# Patient Record
(demographics unavailable — no encounter records)

---

## 2025-04-07 NOTE — IMAGING
[de-identified] : The patient is a well appearing 18 year old male of their stated age. Patient ambulates with a normal gait. Negative straight leg raise.   Effected Foot and Ankle: RIGHT   Inspection: Erythema: None Ecchymosis: None Abrasions: None Effusion: MILD Deformity: None Pes [place] Valgus: Negative Pes Cavus: Negative   Palpation: Crepitus: None Medial Malleolus: Nontender Lateral Malleolus: TENDER  Syndesmosis: Nontender Proximal Fibula: Nontender ATFL: TENDER  CFL: Nontender Deltoid: Nontender Calcaneus: Nontender Talar Head/Neck: Nontender Peroneal Tendons: Nontender Posterior Tibialis: Nontender Achilles Tendon: Nontender & Intact Anterior Capsule: Nontender Hindfoot: Nontender Midfoot: Nontender Forefoot: Nontender   ROM: Ankle Dorsiflexion: 30 degrees Ankle Plantar Flexion: 45 degrees Motor: Dorsiflexion: 5 out of 5 Plantar Flexion: 5 out of 5 Inversion: 5  out of 5 Eversion: 5 out of 5 EHL: 5 out of 5 FHL: 5 out of 5   Provocative Testing: Anterior Drawer: Negative Syndesmosis Squeeze Test: Negative Castillo's Test: Negative Midfoot Stability/Rotation: Negative Heel Raise Inversion: Normal Triple Hop: POSITIVE  Neurologic Exam: L4-S1 Sensation: Grossly Intact Vascular Exam/Pulses: Dorsalis Pedis: 2+ Posterior Tibialis: 2+ Capillary Refill: <2 Seconds   Other Exams: None Pertinent Contralateral Findings: None   Assessment: The patient is a 18 year old male with Right ankle pain and radiographic and physical exam findings consistent with mild lateral ankle sprain The patient's condition is acute. Documents/Results Reviewed Today: X-Ray right ankle Tests/Studies Independently Interpreted Today: X-Ray right ankle is benign, no obvious bony abnormalities Pertinent findings include: ambulates with assistance of crutches, Tender lateral malleolus, Tender ATFL, MILD effusion, + triple hop,  Confounding medical conditions/concerns: None   Plan: Discussed treatment options for the patient's mild lateral ankle sprain. Patient will start physical therapy, HEP, and stretching. Advised patient to obtain Reparel sleeve. Discussed taking OTC anti-inflammatories as needed - use as directed. Modify activity as discussed.  The patient will begin use of a lace-up ankle brace to ensure stability and avoid recurrent injury - fitted and dispensed in office today. Shut down from gm/sports. We will follow up on a week-to-week basis.  Tests Ordered: None  Prescription Medications Ordered: Discussed appropriate use of OTC anti-inflammatories and analgesics (including but not limited to Aleve, Advil, Tylenol, Motrin, Ibuprofen, Voltaren gel, etc.) Braces/DME Ordered: lace up ankle brace Activity/Work/Sports Status: shut down from gym/sports Additional Instructions: None Follow-Up: 1 week  The patient's current medication management of their orthopedic diagnosis was reviewed today: The patient declined and/or was contraindicated for the recommended prescription medication Naprosyn and will use over the counter Advil, Alleve, Voltaren Gel or Tylenol as directed.  (1) We discussed a comprehensive treatment plan that included possible pharmaceutical management involving the use of prescription strength medications versus over the counter oral medications and topical prescription vs over the counter medications.  Based on our extensive discussion, the patient declined prescription medication and will use over the counter Advil, Aleve, Voltaren Gel or Tylenol as directed. (2) There is a moderate risk of morbidity with further treatment, especially from use of prescription strength medications and possible side effects of these medications which include upset stomach with oral medications, skin reactions to topical medications and cardiac/renal issues with long term use. (3) I recommended that the patient follow-up with their medical physician to discuss any significant specific potential issues with long term medication use such as interactions with current medications or with exacerbation of underlying medical comorbidities. (4) The benefits and risks associated with use of injectable, oral or topical, prescription and over the counter anti-inflammatory medications were discussed with the patient. The patient voiced understanding of the risks including but not limited to bleeding, stroke, kidney dysfunction, heart disease, and were referred to the black box warning label for further information.  I, Maria Eugenia Castillo attest that this documentation has been prepared under the direction and in the presence of Provider Dr. Adolfo Wood.

## 2025-04-07 NOTE — IMAGING
[de-identified] : The patient is a well appearing 18 year old male of their stated age. Patient ambulates with a normal gait. Negative straight leg raise.   Effected Foot and Ankle: RIGHT   Inspection: Erythema: None Ecchymosis: None Abrasions: None Effusion: MILD Deformity: None Pes [place] Valgus: Negative Pes Cavus: Negative   Palpation: Crepitus: None Medial Malleolus: Nontender Lateral Malleolus: TENDER  Syndesmosis: Nontender Proximal Fibula: Nontender ATFL: TENDER  CFL: Nontender Deltoid: Nontender Calcaneus: Nontender Talar Head/Neck: Nontender Peroneal Tendons: Nontender Posterior Tibialis: Nontender Achilles Tendon: Nontender & Intact Anterior Capsule: Nontender Hindfoot: Nontender Midfoot: Nontender Forefoot: Nontender   ROM: Ankle Dorsiflexion: 30 degrees Ankle Plantar Flexion: 45 degrees Motor: Dorsiflexion: 5 out of 5 Plantar Flexion: 5 out of 5 Inversion: 5  out of 5 Eversion: 5 out of 5 EHL: 5 out of 5 FHL: 5 out of 5   Provocative Testing: Anterior Drawer: Negative Syndesmosis Squeeze Test: Negative Castillo's Test: Negative Midfoot Stability/Rotation: Negative Heel Raise Inversion: Normal Triple Hop: POSITIVE  Neurologic Exam: L4-S1 Sensation: Grossly Intact Vascular Exam/Pulses: Dorsalis Pedis: 2+ Posterior Tibialis: 2+ Capillary Refill: <2 Seconds   Other Exams: None Pertinent Contralateral Findings: None   Assessment: The patient is a 18 year old male with Right ankle pain and radiographic and physical exam findings consistent with mild lateral ankle sprain The patient's condition is acute. Documents/Results Reviewed Today: X-Ray right ankle Tests/Studies Independently Interpreted Today: X-Ray right ankle is benign, no obvious bony abnormalities Pertinent findings include: ambulates with assistance of crutches, Tender lateral malleolus, Tender ATFL, MILD effusion, + triple hop,  Confounding medical conditions/concerns: None   Plan: Discussed treatment options for the patient's mild lateral ankle sprain. Patient will start physical therapy, HEP, and stretching. Advised patient to obtain Reparel sleeve. Discussed taking OTC anti-inflammatories as needed - use as directed. Modify activity as discussed.  The patient will begin use of a lace-up ankle brace to ensure stability and avoid recurrent injury - fitted and dispensed in office today. Shut down from gm/sports. We will follow up on a week-to-week basis.  Tests Ordered: None  Prescription Medications Ordered: Discussed appropriate use of OTC anti-inflammatories and analgesics (including but not limited to Aleve, Advil, Tylenol, Motrin, Ibuprofen, Voltaren gel, etc.) Braces/DME Ordered: lace up ankle brace Activity/Work/Sports Status: shut down from gym/sports Additional Instructions: None Follow-Up: 1 week  The patient's current medication management of their orthopedic diagnosis was reviewed today: The patient declined and/or was contraindicated for the recommended prescription medication Naprosyn and will use over the counter Advil, Alleve, Voltaren Gel or Tylenol as directed.  (1) We discussed a comprehensive treatment plan that included possible pharmaceutical management involving the use of prescription strength medications versus over the counter oral medications and topical prescription vs over the counter medications.  Based on our extensive discussion, the patient declined prescription medication and will use over the counter Advil, Aleve, Voltaren Gel or Tylenol as directed. (2) There is a moderate risk of morbidity with further treatment, especially from use of prescription strength medications and possible side effects of these medications which include upset stomach with oral medications, skin reactions to topical medications and cardiac/renal issues with long term use. (3) I recommended that the patient follow-up with their medical physician to discuss any significant specific potential issues with long term medication use such as interactions with current medications or with exacerbation of underlying medical comorbidities. (4) The benefits and risks associated with use of injectable, oral or topical, prescription and over the counter anti-inflammatory medications were discussed with the patient. The patient voiced understanding of the risks including but not limited to bleeding, stroke, kidney dysfunction, heart disease, and were referred to the black box warning label for further information.  I, Maria Eugenia Castillo attest that this documentation has been prepared under the direction and in the presence of Provider Dr. Adolfo Wood.

## 2025-04-07 NOTE — HISTORY OF PRESENT ILLNESS
[de-identified] : The patient is a 18 year  old right hand dominant male who presents today complaining of right ankle pain  Date of Injury/Onset: 4/5/25 Pain:    At Rest: _/10  With Activity:  _/10  Mechanism of injury: While practicing lacrosse he rolled his ankle  This is NOT a Work Related Injury being treated under Worker's Compensation. This is NOT an athletic injury occurring associated with an interscholastic or organized sports team. Quality of symptoms: lateral ankle pain and swelling, limited ROM  Improves with: rest, ice, NSAIDs Worse with: weight bearing  Prior treatment: crutches  Prior Imaging: none Out of work/sport: Out of sports since 4/5/25 School/Sport/Position/Occupation: [employer], going to [employer] to play lacrosse midi  Additional Information: L ankle fx surgery Dr. Murguia 2022

## 2025-04-07 NOTE — HISTORY OF PRESENT ILLNESS
[de-identified] : The patient is a 18 year  old right hand dominant male who presents today complaining of right ankle pain  Date of Injury/Onset: 4/5/25 Pain:    At Rest: _/10  With Activity:  _/10  Mechanism of injury: While practicing lacrosse he rolled his ankle  This is NOT a Work Related Injury being treated under Worker's Compensation. This is NOT an athletic injury occurring associated with an interscholastic or organized sports team. 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 Murguia 2022

## 2025-04-15 NOTE — IMAGING
[de-identified] : The patient is a well appearing 18 year old male of their stated age. Patient ambulates with a normal gait. Negative straight leg raise.   Effected Foot and Ankle: RIGHT   Inspection: Erythema: None Ecchymosis: None Abrasions: None Effusion: None  Deformity: None Pes [place] Valgus: Negative Pes Cavus: Negative   Palpation: Crepitus: None Medial Malleolus: Nontender Lateral Malleolus: Nontender Syndesmosis: Nontender Proximal Fibula: Nontender ATFL: TENDER MILDLY CFL: Nontender Deltoid: Nontender Calcaneus: Nontender Talar Head/Neck: Nontender Peroneal Tendons: Nontender Posterior Tibialis: Nontender Achilles Tendon: Nontender & Intact Anterior Capsule: Nontender Hindfoot: Nontender Midfoot: Nontender Forefoot: Nontender   ROM: Ankle Dorsiflexion: 30 degrees Ankle Plantar Flexion: 45 degrees Motor: Dorsiflexion: 5 out of 5 Plantar Flexion: 5 out of 5 Inversion: 5  out of 5 Eversion: 5 out of 5 EHL: 5 out of 5 FHL: 5 out of 5   Provocative Testing: Anterior Drawer: Negative Syndesmosis Squeeze Test: Negative Castillo's Test: Negative Midfoot Stability/Rotation: Negative Heel Raise Inversion: Normal Triple Hop: Negative Neurologic Exam: L4-S1 Sensation: Grossly Intact Vascular Exam/Pulses: Dorsalis Pedis: 2+ Posterior Tibialis: 2+ Capillary Refill: <2 Seconds   Other Exams: None Pertinent Contralateral Findings: None   Assessment: The patient is a 18 year old male with Right ankle pain and radiographic and physical exam findings consistent with mild lateral ankle sprain The patient's condition is acute. Documents/Results Reviewed Today: None  Tests/Studies Independently Interpreted Today: None  Pertinent findings include: mildly tender ATFL, - triple hop,  Confounding medical conditions/concerns: None   Plan: Patient reports gradual improvement with symptoms related to mild lateral ankle sprain. Patient will continue physical therapy, HEP, and stretching. Advised patient to obtain Reparel sleeve. Discussed taking OTC anti-inflammatories as needed - use as directed.  The patient is cleared for gym/sports in lace up ankle brace. Recommended obtaining shoe inserts. The patient will follow up on a PRN basis unless new symptoms arise. Tests Ordered: None  Prescription Medications Ordered: Discussed appropriate use of OTC anti-inflammatories and analgesics (including but not limited to Aleve, Advil, Tylenol, Motrin, Ibuprofen, Voltaren gel, etc.) Braces/DME Ordered: lace up ankle brace Activity/Work/Sports Status: cleared for gym/sports in lace up ankle brace Additional Instructions: full-length shoe inserts  Follow-Up:  PRN  The patient's current medication management of their orthopedic diagnosis was reviewed today: The patient declined and/or was contraindicated for the recommended prescription medication Naprosyn and will use over the counter Advil, Alleve, Voltaren Gel or Tylenol as directed.  (1) We discussed a comprehensive treatment plan that included possible pharmaceutical management involving the use of prescription strength medications versus over the counter oral medications and topical prescription vs over the counter medications.  Based on our extensive discussion, the patient declined prescription medication and will use over the counter Advil, Aleve, Voltaren Gel or Tylenol as directed. (2) There is a moderate risk of morbidity with further treatment, especially from use of prescription strength medications and possible side effects of these medications which include upset stomach with oral medications, skin reactions to topical medications and cardiac/renal issues with long term use. (3) I recommended that the patient follow-up with their medical physician to discuss any significant specific potential issues with long term medication use such as interactions with current medications or with exacerbation of underlying medical comorbidities. (4) The benefits and risks associated with use of injectable, oral or topical, prescription and over the counter anti-inflammatory medications were discussed with the patient. The patient voiced understanding of the risks including but not limited to bleeding, stroke, kidney dysfunction, heart disease, and were referred to the black box warning label for further information.  Maria Eugenia GRAY attest that this documentation has been prepared under the direction and in the presence of Provider Dr. Adolfo Wood.   The documentation recorded by the scribe accurately reflects the services IDr. Adolfo, personally performed and the decisions made by me.

## 2025-04-15 NOTE — HISTORY OF PRESENT ILLNESS
[de-identified] : The patient is a 18 year  old right hand dominant male who presents today complaining of right ankle pain  Date of Injury/Onset: 4/5/25 Pain:    At Rest: 0/10  With Activity:  2/10  Mechanism of injury: While practicing lacrosse he rolled his ankle  This is NOT a Work Related Injury being treated under Worker's Compensation. This is NOT an athletic injury occurring associated with an interscholastic or organized sports team. 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 Experiencing more stiffness and discomfort that goes away when he does his stretching. wearing the ASO. Additional Information: L ankle fx surgery Dr. Murguia 2022

## 2025-04-15 NOTE — IMAGING
[de-identified] : The patient is a well appearing 18 year old male of their stated age. Patient ambulates with a normal gait. Negative straight leg raise.   Effected Foot and Ankle: RIGHT   Inspection: Erythema: None Ecchymosis: None Abrasions: None Effusion: None  Deformity: None Pes [place] Valgus: Negative Pes Cavus: Negative   Palpation: Crepitus: None Medial Malleolus: Nontender Lateral Malleolus: Nontender Syndesmosis: Nontender Proximal Fibula: Nontender ATFL: TENDER MILDLY CFL: Nontender Deltoid: Nontender Calcaneus: Nontender Talar Head/Neck: Nontender Peroneal Tendons: Nontender Posterior Tibialis: Nontender Achilles Tendon: Nontender & Intact Anterior Capsule: Nontender Hindfoot: Nontender Midfoot: Nontender Forefoot: Nontender   ROM: Ankle Dorsiflexion: 30 degrees Ankle Plantar Flexion: 45 degrees Motor: Dorsiflexion: 5 out of 5 Plantar Flexion: 5 out of 5 Inversion: 5  out of 5 Eversion: 5 out of 5 EHL: 5 out of 5 FHL: 5 out of 5   Provocative Testing: Anterior Drawer: Negative Syndesmosis Squeeze Test: Negative Castillo's Test: Negative Midfoot Stability/Rotation: Negative Heel Raise Inversion: Normal Triple Hop: Negative Neurologic Exam: L4-S1 Sensation: Grossly Intact Vascular Exam/Pulses: Dorsalis Pedis: 2+ Posterior Tibialis: 2+ Capillary Refill: <2 Seconds   Other Exams: None Pertinent Contralateral Findings: None   Assessment: The patient is a 18 year old male with Right ankle pain and radiographic and physical exam findings consistent with mild lateral ankle sprain The patient's condition is acute. Documents/Results Reviewed Today: None  Tests/Studies Independently Interpreted Today: None  Pertinent findings include: mildly tender ATFL, - triple hop,  Confounding medical conditions/concerns: None   Plan: Patient reports gradual improvement with symptoms related to mild lateral ankle sprain. Patient will continue physical therapy, HEP, and stretching. Advised patient to obtain Reparel sleeve. Discussed taking OTC anti-inflammatories as needed - use as directed.  The patient is cleared for gym/sports in lace up ankle brace. Recommended obtaining shoe inserts. The patient will follow up on a PRN basis unless new symptoms arise. Tests Ordered: None  Prescription Medications Ordered: Discussed appropriate use of OTC anti-inflammatories and analgesics (including but not limited to Aleve, Advil, Tylenol, Motrin, Ibuprofen, Voltaren gel, etc.) Braces/DME Ordered: lace up ankle brace Activity/Work/Sports Status: cleared for gym/sports in lace up ankle brace Additional Instructions: full-length shoe inserts  Follow-Up:  PRN  The patient's current medication management of their orthopedic diagnosis was reviewed today: The patient declined and/or was contraindicated for the recommended prescription medication Naprosyn and will use over the counter Advil, Alleve, Voltaren Gel or Tylenol as directed.  (1) We discussed a comprehensive treatment plan that included possible pharmaceutical management involving the use of prescription strength medications versus over the counter oral medications and topical prescription vs over the counter medications.  Based on our extensive discussion, the patient declined prescription medication and will use over the counter Advil, Aleve, Voltaren Gel or Tylenol as directed. (2) There is a moderate risk of morbidity with further treatment, especially from use of prescription strength medications and possible side effects of these medications which include upset stomach with oral medications, skin reactions to topical medications and cardiac/renal issues with long term use. (3) I recommended that the patient follow-up with their medical physician to discuss any significant specific potential issues with long term medication use such as interactions with current medications or with exacerbation of underlying medical comorbidities. (4) The benefits and risks associated with use of injectable, oral or topical, prescription and over the counter anti-inflammatory medications were discussed with the patient. The patient voiced understanding of the risks including but not limited to bleeding, stroke, kidney dysfunction, heart disease, and were referred to the black box warning label for further information.  Maria Eugenia GRAY attest that this documentation has been prepared under the direction and in the presence of Provider Dr. Adolfo Wood.   The documentation recorded by the scribe accurately reflects the services IDr. Adolfo, personally performed and the decisions made by me.

## 2025-04-15 NOTE — HISTORY OF PRESENT ILLNESS
[de-identified] : The patient is a 18 year  old right hand dominant male who presents today complaining of right ankle pain  Date of Injury/Onset: 4/5/25 Pain:    At Rest: 0/10  With Activity:  2/10  Mechanism of injury: While practicing lacrosse he rolled his ankle  This is NOT a Work Related Injury being treated under Worker's Compensation. This is NOT an athletic injury occurring associated with an interscholastic or organized sports team. Quality of symptoms: stiffness, discomfort  Improves with: rest, ice, NSAIDs Worse with: before stretching  Treatment/Imaging/Studies Since Last Visit: lace up ankle brace, 3 sessions of PT at [facility] 	Reports Available For Review Today: none Out of work/sport: Out of sports since 4/5/25 School/Sport/Position/Occupation: [employer] senior, going to [employer] to play lacrosse midi  Changes since last visit: patient reports that he has no true pain anymore. Experiencing more stiffness and discomfort that goes away when he does his stretching. wearing the ASO. Additional Information: L ankle fx surgery Dr. Murguia 2022